# Patient Record
Sex: FEMALE | RURAL
[De-identification: names, ages, dates, MRNs, and addresses within clinical notes are randomized per-mention and may not be internally consistent; named-entity substitution may affect disease eponyms.]

---

## 2022-04-19 ENCOUNTER — TELEPHONE (OUTPATIENT)
Dept: FAMILY MEDICINE CLINIC | Facility: CLINIC | Age: 57
End: 2022-04-19

## 2022-04-19 DIAGNOSIS — F41.9 ANXIETY: Primary | ICD-10-CM

## 2022-04-19 RX ORDER — ALPRAZOLAM 0.25 MG/1
0.25 TABLET ORAL 3 TIMES DAILY PRN
Qty: 90 TABLET | Refills: 2 | Status: SHIPPED | OUTPATIENT
Start: 2022-04-19

## 2022-04-19 NOTE — TELEPHONE ENCOUNTER
Rx Refill Note  Requested Prescriptions      No prescriptions requested or ordered in this encounter      Last office visit with prescribing clinician: Visit date not found      Next office visit with prescribing clinician: 4/28/2022     Ruchi Lundberg MA  04/19/22, 15:41 CDT

## 2022-04-19 NOTE — TELEPHONE ENCOUNTER
Caller: Shasta Moreno    Relationship: Self    Best call back number: [phone]     Requested Prescriptions:   Patient called and wanted to request to have Xanax .25 filled.     Pharmacy where request should be sent: [facility] - [address] - [phone] [facility] [phone]      Additional details provided by patient:   Patient is out of medication    Does the patient have less than a 3 day supply:  [x] Yes  [] No    Jelena Franklin, PCT   04/19/22 08:15 CDT

## 2022-04-20 ENCOUNTER — TELEPHONE (OUTPATIENT)
Dept: FAMILY MEDICINE CLINIC | Facility: CLINIC | Age: 57
End: 2022-04-20

## 2022-04-20 DIAGNOSIS — E03.9 HYPOTHYROIDISM, UNSPECIFIED TYPE: ICD-10-CM

## 2022-04-20 DIAGNOSIS — R53.83 FATIGUE, UNSPECIFIED TYPE: Primary | ICD-10-CM

## 2022-04-20 DIAGNOSIS — E78.2 MIXED HYPERLIPIDEMIA: ICD-10-CM

## 2022-04-20 DIAGNOSIS — E11.65 TYPE 2 DIABETES MELLITUS WITH HYPERGLYCEMIA, UNSPECIFIED WHETHER LONG TERM INSULIN USE: ICD-10-CM

## 2022-04-20 DIAGNOSIS — I10 HYPERTENSION, UNSPECIFIED TYPE: ICD-10-CM

## 2022-04-20 NOTE — TELEPHONE ENCOUNTER
That was the earliest patient was able to make an appt. Marta has seen her in the last 3 months. This is just for a med refill appt and an initial UDS and contract.    Detail Level: Detailed Quality 110: Preventive Care And Screening: Influenza Immunization: Influenza Immunization Administered during Influenza season

## 2024-05-22 NOTE — PROGRESS NOTES
Chief Complaint  Renal Mass    Subjective          Shasta Moreno presents to [facility] UROLOGY     Renal mass  The patient presents with a left renal mass. This was first identified approximately 3 weeks ago. This was found in the context of an evaluation of an unrelated illness on a MRI of the abdomen . This is a new diagnois problem. The onset was unknown. Associated symptoms/signs to consider: Abdominal pain, but no pain, and but no gross hematuria.          Current Outpatient Medications:     ALPRAZolam (Xanax) 0.25 MG tablet, Take 1 tablet by mouth 3 (Three) Times a Day As Needed for Anxiety., Disp: 90 tablet, Rfl: 2    amitriptyline (ELAVIL) 50 MG tablet, Take 1 tablet by mouth Every Night., Disp: , Rfl:     atorvastatin (LIPITOR) 20 MG tablet, Take 1 tablet by mouth Daily., Disp: , Rfl:     cetirizine (zyrTEC) 10 MG tablet, Take 1 tablet by mouth Daily., Disp: , Rfl:     Ferrous Sulfate 300 (60 Fe) MG/5ML solution, Take 5 mL by mouth Daily., Disp: , Rfl:     HYDROcodone-acetaminophen (NORCO) 5-325 MG per tablet, Take 1 tablet by mouth Every 6 (Six) Hours As Needed., Disp: , Rfl:     levothyroxine (SYNTHROID, LEVOTHROID) 100 MCG tablet, , Disp: , Rfl:     lisinopril (PRINIVIL,ZESTRIL) 40 MG tablet, Take 0.5 tablets twice a day by oral route for 90 days., Disp: , Rfl:     magnesium citrate 1.745 GM/30ML solution solution, Take  by mouth 1 (One) Time., Disp: , Rfl:     omeprazole (priLOSEC) 20 MG capsule, , Disp: , Rfl:     Ozempic, 1 MG/DOSE, 4 MG/3ML solution pen-injector, , Disp: , Rfl:     venlafaxine XR (EFFEXOR-XR) 75 MG 24 hr capsule, Take 1 capsule 3 times a day by oral route for 90 days., Disp: , Rfl:     Vitamin A 3 MG (12823 UT) capsule, Take 1 capsule by mouth Daily., Disp: , Rfl:     vitamin C (ASCORBIC ACID) 250 MG tablet, Take 1 tablet by mouth Daily., Disp: , Rfl:   Past Medical History:   Diagnosis Date    Hypertension      Past Surgical History:   Procedure Laterality Date     "BLADDER SURGERY      GALLBLADDER SURGERY      GASTRIC BYPASS      TUBAL ABDOMINAL LIGATION             Review of Systems      Objective   PHYSICAL EXAM  Vital Signs:   Temp 96.7 °F (35.9 °C)   Ht 165.1 cm (65\")   Wt 78.8 kg (173 lb 12.8 oz)   BMI 28.92 kg/m²     Physical Exam      DATA  Result Review :              Results for orders placed or performed in visit on 06/11/24   POC Urinalysis Dipstick, Multipro    Specimen: Urine   Result Value Ref Range    Color Yellow Yellow, Straw, Dark Yellow, Elva    Clarity, UA Clear Clear    Glucose, UA Negative Negative mg/dL    Bilirubin Negative Negative    Ketones, UA Negative Negative    Specific Gravity  1.010 1.005 - 1.030    Blood, UA Negative Negative    pH, Urine 6.5 5.0 - 8.0    Protein, POC Negative Negative mg/dL    Urobilinogen, UA Normal Normal, 0.2 E.U./dL    Nitrite, UA Negative Negative    Leukocytes Negative Negative       MRI outside films (04/18/2024 00:05)   I reviewed these films myself.  She does have a lower pole lesion that is exophytic.  Since no contrast was given for this study I cannot tell whether or not this is enhancing.       ASSESSMENT AND PLAN          Problem List Items Addressed This Visit    None  Visit Diagnoses       Renal mass, left    -  Primary    Relevant Orders    POC Urinalysis Dipstick, Multipro (Completed)        This represents an undiagnosed new problem with uncertain prognosis until further evaluated. Patient needs further evaluation of this.  This was noncontrasted study.  This warrants for further evaluation.  Recommend she undergo a CT renal mass protocol to assess enhancement of both arterial and venous phases.       FOLLOW UP     Return in about 3 weeks (around 7/2/2024) for ct renal mass protocol before next visit.        (Please note that portions of this note were completed with a voice recognition program.)  Artem Castellano MD  06/11/24  14:43 CDT  "

## 2024-06-10 ENCOUNTER — TELEPHONE (OUTPATIENT)
Dept: UROLOGY | Facility: CLINIC | Age: 59
End: 2024-06-10
Payer: COMMERCIAL

## 2024-06-10 NOTE — TELEPHONE ENCOUNTER
Called Patient to remind them to get Disc prior to appointment.  Spoke with Patient.  If patient calls back it is ok for the HUB to tell the pt the message.

## 2024-06-11 ENCOUNTER — OFFICE VISIT (OUTPATIENT)
Dept: UROLOGY | Facility: CLINIC | Age: 59
End: 2024-06-11
Payer: COMMERCIAL

## 2024-06-11 VITALS — TEMPERATURE: 96.7 F | WEIGHT: 173.8 LBS | BODY MASS INDEX: 28.96 KG/M2 | HEIGHT: 65 IN

## 2024-06-11 DIAGNOSIS — N28.89 RENAL MASS, LEFT: Primary | ICD-10-CM

## 2024-06-11 LAB
BILIRUB BLD-MCNC: NEGATIVE MG/DL
CLARITY, POC: CLEAR
COLOR UR: YELLOW
GLUCOSE UR STRIP-MCNC: NEGATIVE MG/DL
KETONES UR QL: NEGATIVE
LEUKOCYTE EST, POC: NEGATIVE
NITRITE UR-MCNC: NEGATIVE MG/ML
PH UR: 6.5 [PH] (ref 5–8)
PROT UR STRIP-MCNC: NEGATIVE MG/DL
RBC # UR STRIP: NEGATIVE /UL
SP GR UR: 1.01 (ref 1–1.03)
UROBILINOGEN UR QL: NORMAL

## 2024-06-11 PROCEDURE — 99204 OFFICE O/P NEW MOD 45 MIN: CPT | Performed by: UROLOGY

## 2024-06-11 PROCEDURE — 81003 URINALYSIS AUTO W/O SCOPE: CPT | Performed by: UROLOGY

## 2024-06-11 RX ORDER — MAGNESIUM CARB/ALUMINUM HYDROX 105-160MG
TABLET,CHEWABLE ORAL ONCE
COMMUNITY

## 2024-06-11 RX ORDER — CETIRIZINE HYDROCHLORIDE 10 MG/1
10 TABLET ORAL DAILY
COMMUNITY

## 2024-06-11 RX ORDER — MULTIVIT WITH MINERALS/LUTEIN
250 TABLET ORAL DAILY
COMMUNITY

## 2024-06-11 RX ORDER — AMITRIPTYLINE HYDROCHLORIDE 50 MG/1
50 TABLET, FILM COATED ORAL NIGHTLY
COMMUNITY

## 2024-06-11 RX ORDER — HYDROCODONE BITARTRATE AND ACETAMINOPHEN 5; 325 MG/1; MG/1
1 TABLET ORAL EVERY 6 HOURS PRN
COMMUNITY

## 2024-06-11 RX ORDER — LEVOTHYROXINE SODIUM 0.1 MG/1
TABLET ORAL
COMMUNITY
Start: 2024-05-21

## 2024-06-11 RX ORDER — ATORVASTATIN CALCIUM 20 MG/1
20 TABLET, FILM COATED ORAL DAILY
COMMUNITY

## 2024-06-11 RX ORDER — SEMAGLUTIDE 1.34 MG/ML
INJECTION, SOLUTION SUBCUTANEOUS
COMMUNITY
Start: 2024-02-29

## 2024-06-11 RX ORDER — OMEPRAZOLE 20 MG/1
CAPSULE, DELAYED RELEASE ORAL
COMMUNITY
Start: 2024-05-29

## 2024-06-11 RX ORDER — FERROUS SULFATE 300 MG/5ML
300 LIQUID (ML) ORAL DAILY
COMMUNITY

## 2024-06-11 RX ORDER — LISINOPRIL 40 MG/1
TABLET ORAL
COMMUNITY
Start: 2024-03-21

## 2024-06-11 RX ORDER — VENLAFAXINE HYDROCHLORIDE 75 MG/1
CAPSULE, EXTENDED RELEASE ORAL
COMMUNITY
Start: 2024-03-21

## 2024-06-17 DIAGNOSIS — N28.89 RENAL MASS, LEFT: Primary | ICD-10-CM

## 2024-06-17 PROCEDURE — 99024 POSTOP FOLLOW-UP VISIT: CPT | Performed by: UROLOGY

## 2024-06-25 NOTE — PROGRESS NOTES
Subjective    Ms. Moreno is 58 y.o. female    Chief Complaint: Left Renal Mass    History of Present Illness  She returns today for follow-up of a left renal mass.  She underwent a noncontrasted MRI prior to her visit with me.  I had her come back for CT with renal mass protocol today.    The patient denies any hematuria, flank pain patient denies gross hematuria, flank pain, abdominal pain, night sweats, unexplained weight loss, unusual musculoskeletal pain, hemoptysis, or unexplained shortness of breath.       The following portions of the patient's history were reviewed and updated as appropriate: allergies, current medications, past family history, past medical history, past social history, past surgical history and problem list.    Review of Systems      Current Outpatient Medications:     ALPRAZolam (Xanax) 0.25 MG tablet, Take 1 tablet by mouth 3 (Three) Times a Day As Needed for Anxiety., Disp: 90 tablet, Rfl: 2    amitriptyline (ELAVIL) 50 MG tablet, Take 1 tablet by mouth Every Night., Disp: , Rfl:     atorvastatin (LIPITOR) 20 MG tablet, Take 1 tablet by mouth Daily., Disp: , Rfl:     cetirizine (zyrTEC) 10 MG tablet, Take 1 tablet by mouth Daily., Disp: , Rfl:     Ferrous Sulfate 300 (60 Fe) MG/5ML solution, Take 5 mL by mouth Daily., Disp: , Rfl:     HYDROcodone-acetaminophen (NORCO) 5-325 MG per tablet, Take 1 tablet by mouth Every 6 (Six) Hours As Needed., Disp: , Rfl:     levothyroxine (SYNTHROID, LEVOTHROID) 100 MCG tablet, , Disp: , Rfl:     lisinopril (PRINIVIL,ZESTRIL) 40 MG tablet, Take 0.5 tablets twice a day by oral route for 90 days., Disp: , Rfl:     magnesium citrate 1.745 GM/30ML solution solution, Take  by mouth 1 (One) Time., Disp: , Rfl:     omeprazole (priLOSEC) 20 MG capsule, , Disp: , Rfl:     Ozempic, 1 MG/DOSE, 4 MG/3ML solution pen-injector, , Disp: , Rfl:     venlafaxine XR (EFFEXOR-XR) 75 MG 24 hr capsule, Take 1 capsule 3 times a day by oral route for 90 days., Disp: , Rfl:      Vitamin A 3 MG (64869 UT) capsule, Take 1 capsule by mouth Daily., Disp: , Rfl:     vitamin C (ASCORBIC ACID) 250 MG tablet, Take 1 tablet by mouth Daily., Disp: , Rfl:     Past Medical History:   Diagnosis Date    Hypertension        Past Surgical History:   Procedure Laterality Date    BLADDER SURGERY      GALLBLADDER SURGERY      GASTRIC BYPASS      TUBAL ABDOMINAL LIGATION         Social History     Socioeconomic History    Marital status: Single       No family history on file.    Objective    There were no vitals taken for this visit.    Physical Exam        Results for orders placed or performed in visit on 06/11/24   POC Urinalysis Dipstick, Multipro    Specimen: Urine   Result Value Ref Range    Color Yellow Yellow, Straw, Dark Yellow, Elva    Clarity, UA Clear Clear    Glucose, UA Negative Negative mg/dL    Bilirubin Negative Negative    Ketones, UA Negative Negative    Specific Gravity  1.010 1.005 - 1.030    Blood, UA Negative Negative    pH, Urine 6.5 5.0 - 8.0    Protein, POC Negative Negative mg/dL    Urobilinogen, UA Normal Normal, 0.2 E.U./dL    Nitrite, UA Negative Negative    Leukocytes Negative Negative   CT Abdomen Kidney With & Without Contrast (07/05/2024 08:50)   Reviewed report and the images.  I reviewed these images.  This mass is about the same size at 2.2 cm.  There is definite enhancement.  I calculated greater than 80 Hounsfield units of enhancement.  There is no fat within this lesion.  This is worrisome for renal cell neoplasm, most likely renal cell carcinoma/DLS        Assessment and Plan    Diagnoses and all orders for this visit:    1. Renal mass, left (Primary)  -     POC Urinalysis Dipstick, Multipro      FIRST we discussed the natural history of incidentally identified small renal masses. We spent a great deal of time discussing the differential diagnosis of the incidental renal mass. Solid renal masses under 3 cm have about a 25% chance of being benign and an additional 3-5%  will be of low malignant potential at final pathology (Keegan et al. Journal of Urology 170(6):2217-20, 2003).      The following options and their implications are discussed:  -Following lesion with serial radiographs (active surveillance): The natural history of MOST contrast enhancing  small renal masses (SRM) is one of very slow progression. It is explained that a fairly recent retrospective meta-analysis of 234 small renal masses (SRM) demonstrate a growth rate of approximately 0.28cm/year on observation. Prospective active surveillance programs confirm slow growth rates of only 0.1cm/year with very low rates of metastatic progression (<1% in two to three years). The [facility] literature suggests that malignant tumors less than 3 cm have a very low likelihood of metastatic progression and, likewise, the rate of metastasis among patients with sporadic tumors on surveillance is very low (1.3% in Liyapen et al.) In this setting per the American Urologic Association guidelines, renal mass biopsy should be considered.  Diagnostic approach, differential diagnosis, and management of a small renal mass; Up to date, Literature review current up to July 2021  - Partial nephrectomy: Partial nephrectomy indicates the removal of mass while sparing the majority of the renal parenchyma of the affected kidney.  Per the AUA guidelines clinician should prioritize partial nephrectomy for the management of cT1a masses when intervention is indicated.  The rationale is that partial nephrectomy minimizes the risk of chronic kidney disease or chronic kidney disease progression and is associated with favorable oncologic outcomes, including excellent local control and most centers consider this the procedure of choice.  I explained to the patient that most oncologic studies have shown equal survival in metastatic rates with radical nephrectomy. The only exception would be the possibility of increased local recurrence, either at partial  "nephrectomy site or metachronous lesion in same kidney, in this group of patient but overall survival is probably not affected. Risks of urine leak which could lead to infection or even need for nephrectomy, bleeding that can occur both intraoperatively and/or postoperatively, need for additional procedures such as angiographic procedures to manage pseudoaneurysm or AV malformation, stent placement to divert urine, percutaneous drainage for urinoma.  -CT-guided ablative therapy: It is explained the American Urologic Association says \"clinician should consider thermal ablation as an alternative approach for the management of cT1a  solid renal masses< 3 cm in size.  Most studies show very effective local control of renal masses, particularly small ones that are low-grade based upon biopsy.  However, it is explained increased likelihood of tumor persistence or local recurrence relative to surgical excision should be considered.  I also explained that a repeat thermal ablation of the mass is also a very reasonable option in that setting.  I did explain to the patient that long-term follow-up is missing in most of the studies but this does appear to be very reasonable, less invasive approach to the management of small renal masses.  The level of expertise required does warrant referral to a tertiary care center with experience as we do not offer this option locally to our patients.  Risks such as urine leak and bleeding as per partial nephrectomy are possible.  -Radical nephrectomy: Once considered the standard of care for solid enhancing renal lesions, radical nephrectomy is basically reserved for those patients who have high risk for complication from partial nephrectomy, multifocal renal cell carcinoma only involving the ipsilateral kidney, and other special considerations.  It is emphasized that this also greatly increases the risk of development of chronic kidney disease and potentially even end-stage renal disease " requiring renal replacement therapy.    She would like to consider treatment even though this mass is under 3 cm.  Obviously this is reasonable.  She is interested in the possibility of CT-guided ablative therapy as well as possible removal of the mass.  I explained her I could not offer her CT-guided ablative therapy as we have no interventional radiology at our institution.  I have typically transferred such patients into the care of Dr. Hdz at [facility] or Dr. Choudhary at [facility].  I think the other option is to potentially do nephron sparing surgery on her with partial nephrectomy.  I explained her the posterior location and her prior gastric bypass surgery that I really would not be comfortable approaching this laparoscopically.  It may be that she needs an open partial nephrectomy again considering the prior surgery and posterior location.  I have recommended she see Dr. Hdz at [facility].  She will take copies of her imaging by hand with her.    Electronically signed by Artem Castellano MD, 07/09/24, 2:45 PM CDT.

## 2024-07-05 ENCOUNTER — HOSPITAL ENCOUNTER (OUTPATIENT)
Dept: CT IMAGING | Facility: HOSPITAL | Age: 59
Discharge: HOME | End: 2024-07-05
Admitting: UROLOGY
Payer: COMMERCIAL

## 2024-07-05 DIAGNOSIS — N28.89 RENAL MASS, LEFT: ICD-10-CM

## 2024-07-05 LAB — CREAT BLDA-MCNC: 0.6 MG/DL (ref 0.6–1.3)

## 2024-07-05 PROCEDURE — 74170 CT ABD WO CNTRST FLWD CNTRST: CPT

## 2024-07-05 PROCEDURE — 82565 ASSAY OF CREATININE: CPT

## 2024-07-05 PROCEDURE — 25510000001 IOPAMIDOL PER 1 ML: Performed by: UROLOGY

## 2024-07-05 RX ADMIN — IOPAMIDOL 100 ML: 755 INJECTION, SOLUTION INTRAVENOUS at 08:54

## 2024-07-09 ENCOUNTER — OFFICE VISIT (OUTPATIENT)
Dept: UROLOGY | Facility: CLINIC | Age: 59
End: 2024-07-09
Payer: COMMERCIAL

## 2024-07-09 VITALS — BODY MASS INDEX: 28.72 KG/M2 | HEIGHT: 65 IN | TEMPERATURE: 96 F | WEIGHT: 172.4 LBS

## 2024-07-09 DIAGNOSIS — N28.89 RENAL MASS, LEFT: Primary | ICD-10-CM

## 2024-07-09 LAB
BILIRUB BLD-MCNC: NEGATIVE MG/DL
CLARITY, POC: CLEAR
COLOR UR: YELLOW
GLUCOSE UR STRIP-MCNC: ABNORMAL MG/DL
KETONES UR QL: NEGATIVE
LEUKOCYTE EST, POC: NEGATIVE
NITRITE UR-MCNC: NEGATIVE MG/ML
PH UR: 5.5 [PH] (ref 5–8)
PROT UR STRIP-MCNC: NEGATIVE MG/DL
RBC # UR STRIP: NEGATIVE /UL
SP GR UR: 1.03 (ref 1–1.03)
UROBILINOGEN UR QL: NORMAL

## 2024-07-15 ENCOUNTER — TELEPHONE (OUTPATIENT)
Dept: UROLOGY | Facility: CLINIC | Age: 59
End: 2024-07-15
Payer: COMMERCIAL

## 2024-07-15 NOTE — TELEPHONE ENCOUNTER
Caller: PREET    Relationship to patient: SELF    Best call back number: [phone]    Patient is needing: PT WOULD LIKE TO BE REF TO [facility] INSTEAD OF [facility].  PLEASE CALL PT TO DISCUSS  PT WOULD LIKE TO SEE LAINE SOTO.

## 2024-07-16 DIAGNOSIS — N28.89 RENAL MASS, LEFT: Primary | ICD-10-CM
